# Patient Record
Sex: MALE | Race: OTHER | HISPANIC OR LATINO | URBAN - METROPOLITAN AREA
[De-identification: names, ages, dates, MRNs, and addresses within clinical notes are randomized per-mention and may not be internally consistent; named-entity substitution may affect disease eponyms.]

---

## 2020-08-03 ENCOUNTER — EMERGENCY (EMERGENCY)
Facility: HOSPITAL | Age: 45
LOS: 1 days | Discharge: HOME | End: 2020-08-03
Attending: EMERGENCY MEDICINE | Admitting: EMERGENCY MEDICINE
Payer: MEDICAID

## 2020-08-03 VITALS
DIASTOLIC BLOOD PRESSURE: 89 MMHG | HEART RATE: 85 BPM | TEMPERATURE: 98 F | RESPIRATION RATE: 18 BRPM | SYSTOLIC BLOOD PRESSURE: 135 MMHG | OXYGEN SATURATION: 98 %

## 2020-08-03 PROCEDURE — 99283 EMERGENCY DEPT VISIT LOW MDM: CPT

## 2020-08-03 RX ORDER — FAMOTIDINE 10 MG/ML
20 INJECTION INTRAVENOUS ONCE
Refills: 0 | Status: COMPLETED | OUTPATIENT
Start: 2020-08-03 | End: 2020-08-03

## 2020-08-03 RX ORDER — FAMOTIDINE 10 MG/ML
1 INJECTION INTRAVENOUS
Qty: 14 | Refills: 0
Start: 2020-08-03 | End: 2020-08-09

## 2020-08-03 RX ORDER — AMLODIPINE BESYLATE 2.5 MG/1
1 TABLET ORAL
Qty: 0 | Refills: 0 | DISCHARGE

## 2020-08-03 RX ORDER — FUROSEMIDE 40 MG
1 TABLET ORAL
Qty: 0 | Refills: 0 | DISCHARGE

## 2020-08-03 RX ADMIN — FAMOTIDINE 20 MILLIGRAM(S): 10 INJECTION INTRAVENOUS at 18:51

## 2020-08-03 RX ADMIN — Medication 30 MILLILITER(S): at 18:51

## 2020-08-03 NOTE — ED PROVIDER NOTE - OBJECTIVE STATEMENT
45yM with history of HTN presenting with persistent gastritis. Reports burning, epigastric pain that is worse and night and relieved with food. Currently taking 20mg omeprazole for gastritis. Reports having to vomit after waking up due to nausea and Friday vomited "small" amount of blood from retching. No current NSAID use. Is able to tolerate liquid and solid foods and denies recent weight loss and dysphagia. Today, denies chest pain, SOB, peripheral edema, and further episodes of vomiting. 45yM with history of HTN presenting with persistent gastritis. Reports burning, epigastric pain that is worse and night and relieved with food. Diagnosed with gastritis 5 years ago in [place] via endoscopy. Currently taking 20mg omeprazole for gastritis. Reports having to vomit after waking up due to nausea and Friday vomited "small" amount of blood from retching. No current NSAID use. Is able to tolerate liquid and solid foods and denies recent weight loss and dysphagia. Today, denies chest pain, SOB, peripheral edema, and further episodes of vomiting. Patient scheduled to see new PCP Edward Mckeon in [facility] this Friday. Will receive referral for gastroenterologist then. 45yM with history of HTN presenting with persistent abd pain. Reports burning, epigastric pain that is worse and night and relieved with food. Diagnosed with gastritis 5 years ago in [place] via endoscopy. Currently taking 20mg omeprazole for gastritis. Reports having to vomit after waking up due to nausea and Friday vomited "small" amount of blood from retching. No current NSAID use. Is able to tolerate liquid and solid foods and denies recent weight loss and dysphagia. Today, denies chest pain, SOB, peripheral edema, and further episodes of vomiting. Patient scheduled to see new PCP Edward Mckeon in [facility] this Friday. Will receive referral for gastroenterologist then.

## 2020-08-03 NOTE — ED ADULT TRIAGE NOTE - CHIEF COMPLAINT QUOTE
p/t c/o of diffuse abd pain for 10 days, denies any nausea or vomiting, no dysuria, no recent travel

## 2020-08-03 NOTE — ED PROVIDER NOTE - ATTENDING CONTRIBUTION TO CARE
ED Attending (Dr Ga): I have personally seen and examined this patient and I have fully participated in their care.  I have reviewed all pertinent clinical information, including the history, physical exam, plan and medical student's note, and agree except as noted. 45 yM with history of gastritis presenting for worsened epigastric pain with N/V. Patient scheduled to see new PCP this Friday but is seeking symptomatic relief today. Able to tolerate food and denies anorexia and unintentional weight loss. Will order Maalox and Pepcid for symptoms today and reassess. Low risk for ACS given lack of risk factors, and will order EKG to further stratify.

## 2020-08-03 NOTE — ED PROVIDER NOTE - PATIENT PORTAL LINK FT
You can access the FollowMyHealth Patient Portal offered by [facility] by registering at the following website: [email or web address]. By joining [facility]’s FollowMyHealth portal, you will also be able to view your health information using other applications (apps) compatible with our system.

## 2020-08-03 NOTE — ED PROVIDER NOTE - CLINICAL SUMMARY MEDICAL DECISION MAKING FREE TEXT BOX
45 yM with history of gastritis presenting for worsened epigastric pain with N/V. Patient scheduled to see new PCP this Friday but is seeking symptomatic relief today. Able to tolerate food and denies anorexia and unintentional weight loss. Will order Maalox and Pepcid for symptoms today and reassess. Low suspicion for ACS given history and description of epigastric pain. Will order EKG to r/o ACS 45 yM with history of gastritis presenting for worsened epigastric pain with N/V. Patient scheduled to see new PCP this Friday but is seeking symptomatic relief today. Able to tolerate food and denies anorexia and unintentional weight loss. Will order Maalox and Pepcid for symptoms today and reassess. Low risk for ACS given lack of risk factors, and will order EKG to further stratify.

## 2020-08-03 NOTE — ED PROVIDER NOTE - NSFOLLOWUPINSTRUCTIONS_ED_ALL_ED_FT
A cause for your symptoms was not found.  Take pepcid and maalox as prescribed, see your doctor friday as scheduled since you need more testing.     Return to the ER for worsening pain, vomiting, o rother concerning signs,.

## 2023-10-06 NOTE — ED PROVIDER NOTE - NO SIGNIFICANT PAST SURGICAL HISTORY
Last Visit Date: 8/15/2023  Next Visit Date: Visit date not found <<----- Click to add NO significant Past Surgical History